# Patient Record
Sex: FEMALE | Race: WHITE | NOT HISPANIC OR LATINO | Employment: UNEMPLOYED | URBAN - METROPOLITAN AREA
[De-identification: names, ages, dates, MRNs, and addresses within clinical notes are randomized per-mention and may not be internally consistent; named-entity substitution may affect disease eponyms.]

---

## 2024-01-29 ENCOUNTER — TELEPHONE (OUTPATIENT)
Dept: OBSTETRICS AND GYNECOLOGY | Facility: CLINIC | Age: 59
End: 2024-01-29

## 2024-01-29 NOTE — TELEPHONE ENCOUNTER
----- Message from Ludivina Medina sent at 1/26/2024  2:44 PM CST -----  Contact: Patient  Patient is calling in regard to scheduling annual appointment as a new patient. Patient was seen by Dr. Madsen in 2019. Please give patient a call back at [phone].

## 2024-12-23 ENCOUNTER — TELEPHONE (OUTPATIENT)
Dept: OBSTETRICS AND GYNECOLOGY | Facility: CLINIC | Age: 59
End: 2024-12-23

## 2024-12-23 NOTE — TELEPHONE ENCOUNTER
----- Message from Sharon sent at 12/23/2024 12:37 PM CST -----  Regarding: appointment access  Contact: Jeniffer  .Type:  Sooner Apoointment Request    Caller is requesting a sooner appointment.  Caller declined first available appointment listed below.  Caller will not accept being placed on the waitlist and is requesting a message be sent to doctor.  Name of Caller: Yamile   When is the first available appointment?  Symptoms:  Would the patient rather a call back or a response via [facility]? call  Best Call Back Number: [phone] (work)   Additional Information:  Jeniffer is a new patient and want to be scheduled for her annual visit/ Self Pay.